# Patient Record
Sex: FEMALE | Race: WHITE
[De-identification: names, ages, dates, MRNs, and addresses within clinical notes are randomized per-mention and may not be internally consistent; named-entity substitution may affect disease eponyms.]

---

## 2017-01-11 NOTE — ANESTHESIA PROGRESS NT - [facility]
Anesthesia Post Op Note


Date & Time


Jan 11, 2017 at 08:49





Vital Signs


Pain Intensity:  0





 Vital Signs Past 12 Hours








  Date Time  Temp Pulse Resp B/P Pulse Ox O2 Delivery O2 Flow Rate FiO2


 


1/11/17 08:23    145/78    


 


1/11/17 08:20  57      


 


1/11/17 08:20  57   95   


 


1/11/17 08:19    152/98    


 


1/11/17 07:03 36.5 70 16 162/95 97 Room Air  











Notes


Mental Status:  alert / awake / arousable, participated in evaluation


Pt Amnestic to Procedure:  Yes


Nausea / Vomiting:  adequately controlled


Pain:  adequately controlled


Airway Patency, RR, SpO2:  stable & adequate


BP & HR:  stable & adequate


Hydration State:  stable & adequate


Anesthetic Complications:  no major complications apparent

## 2017-01-11 NOTE — [facility] OPERATIVE REPORT
Operative Report


1.  PREOPERATIVE DIAGNOSIS:  Senile nuclear cataract, right eye.  





2.  POSTOPERATIVE DIAGNOSIS:  Senile nuclear cataract, right eye.  





3.  PROCEDURE:  Phacoemulsification of right cataract with posterior chamber 

lens implant, type [employer], model [id number] Restor, power +11.5 diopters. 





ANESTHESIA:   Local standby.  SURGEON:  Dr. Mathis. COMPLICATIONS:  None.  

OPERATING TIME:  10 minutes.  





4.  OPERATION AND FINDINGS: 





DESCRIPTION OF PROCEDURE: The right pupil was dilated. The eye was 

appropriately marked. The patient was transported to the Femto Laser. The laser 

was used to make the primary incision and the capsulotomy and to soften the 

lens and placed two arcuate incisions.  The anesthetic was administered using a 

topical technique.  The right eye was prepped and draped.  A speculum was 

placed. A paracentesis was placed. The chamber was filled with Amvisc Plus and 

Viscoat.  Epinephrine solution was used. The capsule was removed. The nucleus 

was hydrodissected.  The lens was removed with phacoemulsification.  Time was 

4.32 seconds.  The aspiration unit was used to remove the cortex.  The capsule 

was filled with Amvisc Plus.  The lens implant was folded and placed into the 

capsule.  The incision was hydrated.  The Amvisc was aspirated.  The wound was 

secure.  The chamber was deep.  The pupil was round.  TobraDex ointment and 

Endocoat solution were placed.  The speculum was removed.  The patient was 

returned to the Recovery Room in stable condition.


I attest to the content of the Intraoperative Record and any orders documented 

therein.  Any exceptions are noted below.  The scribe's documentation has been 

prepared in my presence, under my direction and personally reviewed by me in 

its entirety. I confirm that the note above accurately reflects all work, 

treatment, procedures, and medical decision making performed by me.








I personally scribed for Lino Mathis M.D. ([place]YDA) on 1/11/17 at 08:44.  

Electronically submitted by Deborah SONG).

## 2017-01-11 NOTE — DISCHARGE INSTRUCTIONS-[facility]
Discharge Instructions


Visit


Reason for Visit:  Right Cataract





Discharge


Discharge Diagnosis / Problem:  Lens Implant Right Eye





Discharge Goals


Goal(s):  Improve function





Activity Recommendations


Activity Limitations:  resume your previous activity


Lifting Limitations:  no more than 10 pounds


Exercise/Sports Limitations:  gradually increase as tolerated


May Resume Sexual Activity:  when tolerated


Shower/Bathe:  tomorrow


Driving or Machine Use:  resume 1 day after discharge





Anesthesia


.





Post Anesthesia Instructions:





If you have had General Anesthesia or IV Sedation:





*  Do not drive today.


*  Resume driving when surgeon permits.


*  Do not make important decisions or sign legal documents today.


*  Call surgeon for:





   1.  Temperature elevations greater than 101 degrees F.


   2.  Uncontrollable pain.


   3.  Excessive bleeding.


   4.  Persistent nausea and vomiting.


   5.  Medication intolerance (nausea, vomiting or rash).





*  For nausea and vomiting use only clear liquids such as: tea, soda, bouillon 

until nausea subsides, then gradually increase diet as tolerated.





*  If you have any concerns or questions, call your surgeon's office.  If 

physician is unavailable and it is an emergency, call 911 or go to the nearest 

emergency room.





.





Instructions / Follow-Up


Instructions / Follow-Up





ACTIVITY RECOMMENDATIONS:





*  Light activities.





*  Mild irritation and blurred vision are common for the first few days.





*  You may walk outside, read, watch television.





*  Redness around the white part of the eye is common.








MEDICATIONS:





Resume previous medications unless instructed otherwise by your surgeon.





*  Take white Diamox (Acetazolamide) tablet at 1 pm today.





Start all eye drops at 1 pm today:





*  Eye drops (today and tomorrow):


   Prednisone - one drop in operative eye every 3 hours while awake


            Ofloxacin - one drop in operative eye every  3 hours while awake


   





SPECIAL CARE INSTRUCTIONS:





*  Tape plastic shield over eye to sleep at night.  





Call your doctor at [phone] with any concerns or problems.








FOLLOW UP VISIT:





Follow-up with Dr Mathis at [facility] office as scheduled.





Diet Recommendations


Home Diet:  no limitations





Pending Studies


Studies pending at discharge:  no





Medical Emergencies








.


Who to Call and When:





Medical Emergencies:  If at any time you feel your situation is an emergency, 

please call 911 immediately.





.





Non-Emergent Contact


Non-Emergency issues call your:  Ophthalmologist


Call Non-Emergent contact if:  your pain is not controlled


[phone]


.


.





"Provider Documentation" section prepared by Lino Mathis.

## 2017-01-18 NOTE — DISCHARGE INSTRUCTIONS-[facility]GCT[facility]
Discharge Instructions


Visit


Reason for Visit:  Cataract Left Eye





Discharge Goals


Goal(s):  Improve function





Activity Recommendations


Lifting Limitations:  no more than 10 pounds


Exercise/Sports Limitations:  gradually increase as tolerated


May Resume Sexual Activity:  when tolerated


Shower/Bathe:  tomorrow


Driving or Machine Use:  resume 1 day after discharge





Anesthesia


.





Post Anesthesia Instructions:





If you have had General Anesthesia or IV Sedation:





*  Do not drive today.


*  Resume driving when surgeon permits.


*  Do not make important decisions or sign legal documents today.


*  Call surgeon for:





   1.  Temperature elevations greater than 101 degrees F.


   2.  Uncontrollable pain.


   3.  Excessive bleeding.


   4.  Persistent nausea and vomiting.


   5.  Medication intolerance (nausea, vomiting or rash).





*  For nausea and vomiting use only clear liquids such as: tea, soda, bouillon 

until nausea subsides, then gradually increase diet as tolerated.





*  If you have any concerns or questions, call your surgeon's office.  If 

physician is unavailable and it is an emergency, call 911 or go to the nearest 

emergency room.





.





Instructions / Follow-Up


Instructions / Follow-Up





ACTIVITY RECOMMENDATIONS:





*  Light activities.





*  Mild irritation and blurred vision are common for the first few days.





*  You may walk outside, read, watch television.





*  Redness around the white part of the eye is common.








MEDICATIONS:





Resume previous medications unless instructed otherwise by your surgeon.





*  Take white Diamox (Acetazolamide) tablet at 1 pm today.





Start all eye drops at 1 pm today:





*  Eye drops (today and tomorrow):


   Prednisone - one drop in operative eye every 3 hours while awake


            Ofloxacin - one drop in operative eye every  3 hours while awake


   


SPECIAL CARE INSTRUCTIONS:





*  Tape plastic shield over eye to sleep at night.  





Call your doctor at [phone] with any concerns or problems.








FOLLOW UP VISIT:





Follow-up with Dr Mathis at [facility] as scheduled.





Medical Emergencies








.


Who to Call and When:





Medical Emergencies:  If at any time you feel your situation is an emergency, 

please call 911 immediately.





.





Non-Emergent Contact





.


.





"Provider Documentation" section prepared by Lino Mathis.

## 2017-01-18 NOTE — ANESTHESIA PROGRESS NT - [facility]
Anesthesia Post Op Note


Date & Time


Jan 18, 2017 at 10:37





Vital Signs


Pain Intensity:  0





 Vital Signs Past 12 Hours








  Date Time  Temp Pulse Resp B/P Pulse Ox O2 Delivery O2 Flow Rate FiO2


 


1/18/17 10:26 36.4 60 16 127/73 97 Room Air  


 


1/18/17 09:53  53  124/73 98   


 


1/18/17 09:53  53   98   


 


1/18/17 09:50  50  148/78 98   


 


1/18/17 09:50  50  148/78    


 


1/18/17 08:45 36.4 54 16 134/78 98 Room Air  











Notes


Mental Status:  alert / awake / arousable, participated in evaluation


Pt Amnestic to Procedure:  Yes


Nausea / Vomiting:  adequately controlled


Pain:  adequately controlled


Airway Patency, RR, SpO2:  stable & adequate


BP & HR:  stable & adequate


Hydration State:  stable & adequate


Anesthetic Complications:  no major complications apparent

## 2017-01-18 NOTE — OPERATIVE REPORT
DATE OF OPERATION:  01/18/2017

 

PREOPERATIVE DIAGNOSIS:  Senile nuclear cataract, left eye.

 

POSTOPERATIVE DIAGNOSIS:  Same.

 

PROCEDURE:  Phacoemulsification of left cataract with posterior chamber lens

implant, type Kenny model [id number] ReSTOR power +13.0.

 

ANESTHESIA:  Local standby using 4% topical lidocaine.

 

SURGEON:  Dr. Mathis.

 

COMPLICATIONS:  None.

 

OPERATING TIME:  10 minutes.

 

OPERATION AND FINDINGS:  The patient was identified, the left pupil was

dilated.  The anesthetic was administered topically.  The patient was

transported to the femto laser room.  The femto laser was used to make the

primary incision and 2 astigmatic incisions and the capsulotomy and to soften

the lens nucleus.  The patient was then transported to the operating room

where the left eye was prepped and draped.  A speculum was placed.  A

paracentesis was placed.  The chamber was filled with Amvisc Plus and

EndoCoat.  The nucleus was hydrodissected.  The lens was removed with

phacoemulsification.  Time was 2.67.  The cortex was aspirated.  The capsule

was filled with Amvisc Plus.  The lens implant was inspected and folded and

placed into the capsule.  The Amvisc was aspirated.  The incisions were

hydrated.  The wounds were secure.  The chamber was deep.  The pupil was

round.  Brimonidine and TobraDex and Vigamox were placed.  The speculum was

removed.  The patient was returned to the recovery room in stable condition,

having tolerated the procedure well.

 

 

I attest to the content of the Intraoperative Record and any orders documented 
therein. Any exceptions are noted below.

 

 

 

TABITHA

## 2017-06-20 NOTE — MAMMOGRAPHY REPORT
BILATERAL DIGITAL SCREENING MAMMOGRAM WITH CAD: 6/20/2017

CLINICAL HISTORY: Routine screening.  Patient has no complaints.  





TECHNIQUE:  Bilateral CC and MLO views were obtained.  Current study was also evaluated with a Comput
er Aided Detection (CAD) system.  



COMPARISON: Comparison is made to exams dated:  6/14/2016 mammogram, 6/11/2015 mammogram, 5/19/2014 m
ammogram, 5/17/2013 ultrasound, 5/14/2013 mammogram, and 5/2/2012 mammogram - [facility].   



BREAST COMPOSITION:  There are scattered areas of fibroglandular density in both breasts.  



FINDINGS:  There is a possible cluster of microcalcifications in the anterior 6:00 right breast for w
hich additional spot magnification views are recommended.



There are bilateral benign coarse calcifications. No other suspicious mass, architectural distortion 
or cluster of microcalcifications is seen.  



IMPRESSION:  ACR BI-RADS CATEGORY 0: INCOMPLETE EVALUATION:  NEED ADDITIONAL IMAGING EVALUATION

The possible cluster of faint microcalcifications in the right breast needs additional evaluation.  

The patient will be called to schedule an appointment.  





Approximately 10% of breast cancers are not detected with mammography. A negative mammographic report
 should not delay biopsy if a clinically suggestive mass is present.



Polly Paulino M.D.          

ay/:6/20/2017 14:02:31  



Imaging Technologist: Fatuma Redmond [facility]

letter sent: Addl Imaging 0  

BI-RADS Code: ACR BI-RADS Category 0: Incomplete Evaluation:  Need Additional Imaging Evaluation

## 2017-06-30 NOTE — MAMMOGRAPHY REPORT
UNILATERAL RIGHT DIGITAL DIAGNOSTIC MAMMOGRAM: 6/29/2017

CLINICAL HISTORY: Callback from screening mammogram for right breast calcifications.  





TECHNIQUE:  Spot magnification right cc and ML views were obtained.



COMPARISON: Comparison is made to exams dated:  6/20/2017 mammogram, 6/14/2016 mammogram, 6/11/2015 m
ammogram, 5/19/2014 mammogram, 5/17/2013 ultrasound, and 5/17/2013 mammogram - [facility].   



BREAST COMPOSITION:  There are scattered areas of fibroglandular density in the right breast.  



FINDINGS:  Spot magnification views of the right breast demonstrate a small 8 mm cluster of faint pun
ctate calcifications within the right central/6:00 breast anteriorly.  When compared to prior exams, 
the calcifications are likely not significantly changed compared to the right ML view and rolled righ
t cc view from the 5/9/2011 exam, although it is difficult to make an accurate comparison due to diff
erences in mammographic technique (currently using [employer] equipment, previously using REVENTIVE equipment).
  Given that the calcifications have likely been stable since 2011, the calcifications are probably b
enign.  The options of short interval follow-up versus biopsy were discussed with the patient, and at
 this time we will opt for short interval follow-up.  Two clusters of coarse benign calcifications ar
e also seen within the right anterior breast, consistent with benign degenerating fibroadenomas.



IMPRESSION:  ACR-BI-RADS CATEGORY 3: PROBABLY BENIGN

Small cluster of punctate calcifications in the right central/6:00 breast is likely stable dating lsia
k to the 2011 exam and is therefore probably benign.  Recommend follow-up diagnostic mammograms of th
e right breast in 6 months to confirm stability on spot magnification views.



The patient has been verbally notified of the results.  





Approximately 10% of breast cancers are not detected with mammography. A negative mammographic report
 should not delay biopsy if a clinically suggestive mass is present.



Jeannie Mitchell M.D.          

/:6/29/2017 14:28:53  



Imaging Technologist: Fatuma Redmond, [facility]

letter sent: Follow Up Recommended 3  

BI-RADS Code: ACR-BI-RADS Category 3: Probably Benign

## 2017-12-29 ENCOUNTER — HOSPITAL ENCOUNTER (OUTPATIENT)
Dept: HOSPITAL 45 - C.MAMM | Age: 67
Discharge: HOME | End: 2017-12-29
Attending: INTERNAL MEDICINE
Payer: COMMERCIAL

## 2017-12-29 DIAGNOSIS — R92.1: ICD-10-CM

## 2017-12-29 DIAGNOSIS — R92.2: Primary | ICD-10-CM

## 2017-12-29 NOTE — MAMMOGRAPHY REPORT
UNILATERAL RIGHT DIGITAL DIAGNOSTIC MAMMOGRAM TOMOSYNTHESIS WITH CAD: 12/29/2017

CLINICAL HISTORY: Short interval follow-up of right breast calcifications.  





TECHNIQUE:  Breast tomosynthesis in addition to standard 2D mammography was performed. Current study 
was also evaluated with a Computer Aided Detection (CAD) system.  Right CC and MLO 2-D and tomosynthe
sis images and spot magnification right cc and ML views were obtained.



COMPARISON: Comparison is made to exams dated:  6/29/2017 mammogram, 6/20/2017 mammogram, 6/14/2016 m
ammogram, 6/11/2015 mammogram, 5/19/2014 mammogram, and 5/14/2013 mammogram - [facility].   



BREAST COMPOSITION:  There are scattered areas of fibroglandular density in the right breast.  



FINDINGS:  Again noted is a small 7 mm cluster of faint punctate calcifications within the right 6:00
 anterior breast.  The calcifications are stable compared to spot magnification views dated 6/29/2017
.  Additionally, in retrospect the calcifications do not appear significantly changed compared to the
 rolled CC and right ML views from the 5/9/2011 exam.  Given the long-term stability, the calcificati
ons are probably benign.  A few other loosely grouped punctate benign-appearing calcifications are se
en lateral to the cluster on the spot magnification CC view which also appear stable.



The remainder of the right breast is stable compared to prior exams, without suspicious masses, calci
fications, or areas of architectural distortion noted.  Other scattered benign-appearing calcificatio
ns are not significantly changed.





IMPRESSION:  ACR-BI-RADS CATEGORY 3: PROBABLY BENIGN

Small cluster of faint punctate calcifications in the right 6:00 breast is stable on spot magnificati
on views dated June 2017 and is likely also stable dating back to the 2011 exam.  Given the long-term
 stability, the calcifications are probably benign.  Recommend bilateral diagnostic tomosynthesis jose luis
mograms in 6 months, to reevaluate the right breast calcifications and for routine mammography of the
 left breast.



The patient has been verbally notified of the results.  





Approximately 10% of breast cancers are not detected with mammography. A negative mammographic report
 should not delay biopsy if a clinically suggestive mass is present.



Jeannie Mitchell M.D.          

ah/:12/29/2017 11:07:41  



Imaging Technologist: Cydney TRIPP(DON)(M), [facility]

letter sent: Follow Up Recommended 3  

BI-RADS Code: ACR-BI-RADS Category 3: Probably Benign

## 2018-03-16 ENCOUNTER — HOSPITAL ENCOUNTER (OUTPATIENT)
Dept: HOSPITAL 45 - C.LABBFT | Age: 68
Discharge: HOME | End: 2018-03-16
Attending: INTERNAL MEDICINE
Payer: COMMERCIAL

## 2018-03-16 DIAGNOSIS — E78.5: ICD-10-CM

## 2018-03-16 DIAGNOSIS — Z11.59: ICD-10-CM

## 2018-03-16 DIAGNOSIS — Z00.00: Primary | ICD-10-CM

## 2018-03-16 DIAGNOSIS — I10: ICD-10-CM

## 2018-03-16 DIAGNOSIS — I42.9: ICD-10-CM

## 2018-03-16 LAB
ALBUMIN SERPL-MCNC: 4.1 GM/DL (ref 3.4–5)
ALP SERPL-CCNC: 75 U/L (ref 45–117)
ALT SERPL-CCNC: 26 U/L (ref 12–78)
AST SERPL-CCNC: 24 U/L (ref 15–37)
BASOPHILS # BLD: 0.03 K/UL (ref 0–0.2)
BASOPHILS NFR BLD: 0.4 %
BUN SERPL-MCNC: 17 MG/DL (ref 7–18)
CALCIUM SERPL-MCNC: 9.6 MG/DL (ref 8.5–10.1)
CO2 SERPL-SCNC: 27 MMOL/L (ref 21–32)
CREAT SERPL-MCNC: 0.92 MG/DL (ref 0.6–1.2)
EOS ABS #: 0.14 K/UL (ref 0–0.5)
EOSINOPHIL NFR BLD AUTO: 342 K/UL (ref 130–400)
GLUCOSE SERPL-MCNC: 94 MG/DL (ref 70–99)
HCT VFR BLD CALC: 42.8 % (ref 37–47)
HGB BLD-MCNC: 14.6 G/DL (ref 12–16)
IG#: 0.03 K/UL (ref 0–0.02)
IMM GRANULOCYTES NFR BLD AUTO: 40 %
KETONES UR QL STRIP: 93 MG/DL
LYMPHOCYTES # BLD: 2.76 K/UL (ref 1.2–3.4)
MCH RBC QN AUTO: 31.1 PG (ref 25–34)
MCHC RBC AUTO-ENTMCNC: 34.1 G/DL (ref 32–36)
MCV RBC AUTO: 91.3 FL (ref 80–100)
MONO ABS #: 0.69 K/UL (ref 0.11–0.59)
MONOCYTES NFR BLD: 10 %
NEUT ABS #: 3.25 K/UL (ref 1.4–6.5)
NEUTROPHILS # BLD AUTO: 2 %
NEUTROPHILS NFR BLD AUTO: 47.2 %
PH UR: 179 MG/DL (ref 0–200)
PMV BLD AUTO: 10.5 FL (ref 7.4–10.4)
POTASSIUM SERPL-SCNC: 3.6 MMOL/L (ref 3.5–5.1)
PROT SERPL-MCNC: 7.8 GM/DL (ref 6.4–8.2)
RED CELL DISTRIBUTION WIDTH CV: 12.9 % (ref 11.5–14.5)
RED CELL DISTRIBUTION WIDTH SD: 42.9 FL (ref 36.4–46.3)
SODIUM SERPL-SCNC: 137 MMOL/L (ref 136–145)
WBC # BLD AUTO: 6.9 K/UL (ref 4.8–10.8)

## 2018-07-26 ENCOUNTER — HOSPITAL ENCOUNTER (OUTPATIENT)
Dept: HOSPITAL 45 - C.MAMM | Age: 68
Discharge: HOME | End: 2018-07-26
Attending: INTERNAL MEDICINE
Payer: COMMERCIAL

## 2018-07-26 DIAGNOSIS — R92.1: Primary | ICD-10-CM

## 2018-07-26 NOTE — MAMMOGRAPHY REPORT
BILATERAL DIGITAL DIAGNOSTIC MAMMOGRAM TOMOSYNTHESIS WITH CAD: 7/26/2018

CLINICAL HISTORY: Six-month follow-up of right breast calcifications. Due for routine mammography of 
the left breast.  





TECHNIQUE: The study was acquired using full field digital technology and interpreted from soft copy.
 Breast tomosynthesis in addition to standard 2D mammography was performed. Current study was also ev
aluated with a Computer Aided Detection (CAD) system.  Bilateral CC and MLO 2D and tomosynthesis imag
es and spot magnification right cc and ML views were obtained.



COMPARISON: Comparison is made to exams dated:  12/29/2017 mammogram, 6/29/2017 mammogram, 6/20/2017 
mammogram, 6/14/2016 mammogram, 6/11/2015 mammogram, and 5/19/2014 mammogram - [facility].   

BREAST COMPOSITION: There are scattered areas of fibroglandular density in both breasts.  



FINDINGS: 

Spot magnification views of the right breast again demonstrate a small 7 mm cluster of faint punctate
 calcifications within the right 6:00 breast.  The calcifications are not significantly changed on sp
ot magnification views dating back to the June 2017 exam.  Additionally, the calcifications appear si
milar to some of the older exams including the 2011 exam although it is difficult to make an accurate
 comparison due to differences in mammographic technique.  Although the calcifications have not signi
ficantly changed from at least one year on magnification views, it is the only focal grouping in eith
er breast and the grouping is somewhat linear in distribution on the cc view.  Therefore, recommend s
tereotactic biopsy for further evaluation to rule out the possibility of atypia/malignancy.



The remainder of both breasts are stable compared to prior exams, without suspicious masses, calcific
ations, or areas of architectural distortion noted.



IMPRESSION: ACR BI-RADS CATEGORY 4: SUSPICIOUS

1. Small 5 mm cluster of punctate calcifications in the right 6:00 breast is indeterminate and stereo
tactic biopsy is recommended for further evaluation.

2.  No mammographic evidence of malignancy in the left breast.  Recommend follow-up in 1 year.



A phone call was made to the physician's office to confirm faxed results were received.  

The patient has been verbally notified of the results.  She tentatively scheduled the biopsy before l
eaving the department.





Some breast cancers are not detected with mammography. A negative mammographic report should not mary
y biopsy if a clinically suggestive mass is present.



Jeannie Mitchell M.D.          

ah/:7/26/2018 12:12:55  



Imaging Technologist: RT Primitivo(DON)(M), [facility]

letter sent: Abnormal 4/5  

BI-RADS Code: ACR BI-RADS Category 4: Suspicious

## 2018-08-03 ENCOUNTER — HOSPITAL ENCOUNTER (OUTPATIENT)
Dept: HOSPITAL 45 - C.MAMM | Age: 68
Discharge: HOME | End: 2018-08-03
Attending: INTERNAL MEDICINE
Payer: COMMERCIAL

## 2018-08-03 DIAGNOSIS — N60.21: ICD-10-CM

## 2018-08-03 DIAGNOSIS — R92.0: Primary | ICD-10-CM

## 2018-08-03 NOTE — DISCHARGE INSTRUCTIONS
Discharge Instructions


Procedure


Procedure Date:


Aug 3, 2018.


Reason for visit:


Right Calcs.





Discharge


Discharge Date:


Aug 3, 2018.


Discharge Diagnosis:


status post breast biopsy





Instructions


Activity Recommendations:  Additional Limitations (see below)


Return to School/Work:  no limitations


Recommended Home Diet:  No Limitations


Provider Instructions:





ACTIVITY RECOMMENDATIONS:





*  No lifting, pushing, pulling or exercising the affected side for three days.








RETURN TO SCHOOL/WORK:





*  You may return to work/school after the procedure, but do not perform any 

strenuous


   activities for 24 to 48 hours.








MEDICATIONS:





*  Tylenol (two 325 mg) every four to six hours if needed for mild pain (if not 

allergic to Tylenol).








DIET:





*  Resume previous diet.








SPECIAL CARE INSTRUCTIONS:





*  Keep biopsy site dry for 24 hours.  May shower after 24 hours, but do not 

soak (bathe)


   incision.





*  May remove Tegaderm (plastic patch) 24 hours after procedure





*  Leave the steri-strips on for one week.  Allow the steri-strips to fall off 

by themselves.  


   If not off after one week, you may remove them.  You may place a Bandaid


   crosswise over the strips, if desired.





*  Apply ice 10 minutes on and 10 minutes off as needed.





*  Wear a bra at bedtime to sleep more comfortably for 2-3 days.





*  Your referring physician should have the results after approximately 5 to 7 

business days.





*  Call for unusual bleeding, fever, drainage, etc or if you have any questions 

call


    [phone] during normal business hours or after hours call Dr Mitchell, [phone].








FOLLOW UP VISIT:





Follow-up with Referring Physician as scheduled.





Allergies


Coded Allergies:  


     NO KNOWN DRUG ALLERGIES (Verified  Allergy, Unknown, ., 1/18/17)





Santos Ray Recommendations:


 


Call your doctor if:





*  Temperature above 101 degrees


*  Pain not relieved by pain medicine ordered


*  There is increased drainage or redness from any incision


*  You have any unanswered questions or concerns.





Your Doctors Instructions noted above were prepared by provider Jeannie Mitchell.


Patient Signature Section:





 Patient Instructions Signature Page














Zoë Reilly 











Patient (or Guardian) Signature/Date:____________________________________ I 

have read and understand the instructions given to me by my caregivers.








Caregiver/RN/Doctor Signature/Date:____________________________________











The above-named patient and/or guardian has received patient instructions on 

this date.





























+  Original Patient Signature Page (only) stays with chart.  Please make copy 

for patient.

## 2018-08-03 NOTE — MAMMOGRAPHY REPORT
UNILATERAL RIGHT DIGITAL DIAGNOSTIC MAMMOGRAM: 8/3/2018

CLINICAL HISTORY: Status post right breast stereotactic biopsy.  





TECHNIQUE: The study was acquired using full field digital technology and interpreted from soft copy.
  Postprocedural right CC and ML views were obtained.



COMPARISON: Comparison is made to exams dated:  7/26/2018 mammogram, 12/29/2017 mammogram, 6/29/2017 
mammogram, 6/20/2017 mammogram, 6/14/2016 mammogram, and 6/11/2015 mammogram - [facility].   

BREAST COMPOSITION: There are scattered areas of fibroglandular density in right breast.  



FINDINGS: A new biopsy clip is seen at the site of the biopsied calcifications in the right central/6
:00 breast.  No significant postbiopsy hematoma is seen.







IMPRESSION: POST PROCEDURE IMAGING FOR MARKER PLACEMENT

New biopsy clip status post right breast stereotactic biopsy.  Pathology results are pending.



Some breast cancers are not detected with mammography. A negative mammographic report should not mary
y biopsy if a clinically suggestive mass is present.



Jeannie Mitchell M.D.          

ah/:8/3/2018 13:34:36  



Attending Technologist: Fatuma Geller, RT(R)(M), [facility]

Imaging Technologist: Kaykay Torres RT(R)(M), [facility]



BI-RADS Code: Post Procedure Imaging For Marker Placement

## 2018-08-03 NOTE — MAMMOGRAPHY REPORT
STEREOTACTIC GUIDED BIOPSY RIGHT BREAST: 8/3/2018

CLINICAL HISTORY: Indeterminate calcifications in the right 6:00 breast.  



PATIENT CONSENT: The procedure, risks, benefits, and alternatives of stereotactic biopsy with clip pl
acement were discussed with the patient, and verbal and written consent was obtained.  A timeout was 
performed immediately prior to the procedure.  



PROCEDURE DESCRIPTION: With stereotactic guidance, aseptic technique, and lidocaine as a local anesth
etic (1% lidocaine to anesthetize the skin and 1% lidocaine with epinephrine to anesthetize the deepe
r tissues), the calcifications of concern in the right 6:00 breast were sampled multiple times with a
 9-gauge vacuum-assisted biopsy needle ([employer]tuitive).  The path of approach was caudocranial.  The sp
ecimen radiograph demonstrates calcifications to be present in the samples.  A metallic marker clip w
as placed at the biopsy site.  Postprocedural mammograms were obtained to confirm clip placement.  Di
rect pressure was applied at the biopsy site until hemostasis was achieved.  The patient tolerated th
e procedure without complication.  She was given wound care instructions.  





COMPARISON: Comparison is made to exams dated:  7/26/2018 mammogram, 12/29/2017 mammogram, 6/29/2017 
mammogram, 6/20/2017 mammogram, 6/14/2016 mammogram, and 6/11/2015 mammogram - [facility] [address].   





IMPRESSION: STEREOTACTIC GUIDED BIOPSY

Stereotactic biopsy of indeterminate calcifications in the right 6:00 breast, with clip placement.  T
he patient will receive pathology results from her referring provider.



Jeannie Mitchell M.D.  

ah/:8/3/2018 13:18:42  



Attending Technologist: RT Skinny(R)(M), [facility] [address]

Imaging Technologist: RT Primitivo(R)(M), [facility] [address]